# Patient Record
Sex: MALE | URBAN - NONMETROPOLITAN AREA
[De-identification: names, ages, dates, MRNs, and addresses within clinical notes are randomized per-mention and may not be internally consistent; named-entity substitution may affect disease eponyms.]

---

## 2018-04-03 ENCOUNTER — APPOINTMENT (RX ONLY)
Dept: URBAN - NONMETROPOLITAN AREA CLINIC 32 | Facility: CLINIC | Age: 83
Setting detail: DERMATOLOGY
End: 2018-04-03

## 2018-04-03 DIAGNOSIS — L81.4 OTHER MELANIN HYPERPIGMENTATION: ICD-10-CM

## 2018-04-03 DIAGNOSIS — L82.1 OTHER SEBORRHEIC KERATOSIS: ICD-10-CM

## 2018-04-03 DIAGNOSIS — L85.3 XEROSIS CUTIS: ICD-10-CM

## 2018-04-03 DIAGNOSIS — L57.8 OTHER SKIN CHANGES DUE TO CHRONIC EXPOSURE TO NONIONIZING RADIATION: ICD-10-CM

## 2018-04-03 DIAGNOSIS — L57.0 ACTINIC KERATOSIS: ICD-10-CM

## 2018-04-03 DIAGNOSIS — D22 MELANOCYTIC NEVI: ICD-10-CM

## 2018-04-03 DIAGNOSIS — D485 NEOPLASM OF UNCERTAIN BEHAVIOR OF SKIN: ICD-10-CM

## 2018-04-03 PROBLEM — E13.9 OTHER SPECIFIED DIABETES MELLITUS WITHOUT COMPLICATIONS: Status: ACTIVE | Noted: 2018-04-03

## 2018-04-03 PROBLEM — H91.90 UNSPECIFIED HEARING LOSS, UNSPECIFIED EAR: Status: ACTIVE | Noted: 2018-04-03

## 2018-04-03 PROBLEM — I10 ESSENTIAL (PRIMARY) HYPERTENSION: Status: ACTIVE | Noted: 2018-04-03

## 2018-04-03 PROBLEM — D48.5 NEOPLASM OF UNCERTAIN BEHAVIOR OF SKIN: Status: ACTIVE | Noted: 2018-04-03

## 2018-04-03 PROBLEM — E78.5 HYPERLIPIDEMIA, UNSPECIFIED: Status: ACTIVE | Noted: 2018-04-03

## 2018-04-03 PROBLEM — D22.9 MELANOCYTIC NEVI, UNSPECIFIED: Status: ACTIVE | Noted: 2018-04-03

## 2018-04-03 PROCEDURE — ? LIQUID NITROGEN

## 2018-04-03 PROCEDURE — 17004 DESTROY PREMAL LESIONS 15/>: CPT

## 2018-04-03 PROCEDURE — 99203 OFFICE O/P NEW LOW 30 MIN: CPT | Mod: 25

## 2018-04-03 PROCEDURE — 11100: CPT | Mod: 59

## 2018-04-03 PROCEDURE — ? COUNSELING

## 2018-04-03 PROCEDURE — ? BIOPSY BY SHAVE METHOD

## 2018-04-03 ASSESSMENT — LOCATION DETAILED DESCRIPTION DERM
LOCATION DETAILED: RIGHT CENTRAL MALAR CHEEK
LOCATION DETAILED: LEFT SUPERIOR LATERAL MALAR CHEEK

## 2018-04-03 ASSESSMENT — LOCATION ZONE DERM: LOCATION ZONE: FACE

## 2018-04-03 ASSESSMENT — LOCATION SIMPLE DESCRIPTION DERM
LOCATION SIMPLE: RIGHT CHEEK
LOCATION SIMPLE: LEFT CHEEK

## 2018-04-03 NOTE — PROCEDURE: BIOPSY BY SHAVE METHOD
Post-Care Instructions: I reviewed with the patient in detail post-care instructions. Patient is to keep the biopsy site dry overnight, and then apply bacitracin four times daily until healed, or if patient chooses to not wear bandage, apply bacitracin every hour.  Patient may apply very light hydrogen peroxide soaks to remove any crusting.

## 2018-04-03 NOTE — PROCEDURE: LIQUID NITROGEN
Duration Of Freeze Thaw-Cycle (Seconds): 30
Consent: The patient's consent was obtained including but not limited to risks of crusting, scabbing, blistering, scarring, darker or lighter pigmentary change, recurrence, incomplete removal and infection.
Number Of Freeze-Thaw Cycles: 1 freeze-thaw cycle
Post-Care Instructions: I reviewed with the patient in detail post-care instructions. Patient is to wear sunprotection, and avoid picking at any of the treated lesions. Pt may apply Vaseline to crusted or scabbing areas.
Render Post-Care Instructions In Note?: no
Total Number Of Aks Treated: 16
Aperture Size (Optional): C
Detail Level: Zone

## 2018-04-03 NOTE — PROCEDURE: BIOPSY BY SHAVE METHOD
Notification Instructions: Patient will be notified of biopsy results as soon as results are returned to our office and reviewed by provider.

## 2018-04-09 ENCOUNTER — RX ONLY (OUTPATIENT)
Age: 83
Setting detail: RX ONLY
End: 2018-04-09

## 2018-04-09 RX ORDER — CEPHALEXIN 500 MG/1
CAPSULE ORAL
Qty: 28 | Refills: 0 | Status: ERX | COMMUNITY
Start: 2018-04-09

## 2018-05-03 ENCOUNTER — RX ONLY (OUTPATIENT)
Age: 83
Setting detail: RX ONLY
End: 2018-05-03

## 2018-05-03 ENCOUNTER — APPOINTMENT (RX ONLY)
Dept: URBAN - NONMETROPOLITAN AREA CLINIC 31 | Facility: CLINIC | Age: 83
Setting detail: DERMATOLOGY
End: 2018-05-03

## 2018-05-03 DIAGNOSIS — D485 NEOPLASM OF UNCERTAIN BEHAVIOR OF SKIN: ICD-10-CM

## 2018-05-03 PROBLEM — C44.329 SQUAMOUS CELL CARCINOMA OF SKIN OF OTHER PARTS OF FACE: Status: ACTIVE | Noted: 2018-05-03

## 2018-05-03 PROBLEM — D48.5 NEOPLASM OF UNCERTAIN BEHAVIOR OF SKIN: Status: ACTIVE | Noted: 2018-05-03

## 2018-05-03 PROCEDURE — A4550 SURGICAL TRAYS: HCPCS

## 2018-05-03 PROCEDURE — ? BIOPSY BY SHAVE METHOD

## 2018-05-03 PROCEDURE — 13132 CMPLX RPR F/C/C/M/N/AX/G/H/F: CPT

## 2018-05-03 PROCEDURE — ? MOHS SURGERY

## 2018-05-03 PROCEDURE — 17311 MOHS 1 STAGE H/N/HF/G: CPT

## 2018-05-03 PROCEDURE — 11100: CPT | Mod: 59

## 2018-05-03 RX ORDER — CEPHALEXIN 500 MG/1
CAPSULE ORAL
Qty: 28 | Refills: 0 | Status: ERX

## 2018-05-03 ASSESSMENT — LOCATION ZONE DERM: LOCATION ZONE: SCALP

## 2018-05-03 ASSESSMENT — LOCATION DETAILED DESCRIPTION DERM: LOCATION DETAILED: LEFT SUPERIOR PARIETAL SCALP

## 2018-05-03 ASSESSMENT — LOCATION SIMPLE DESCRIPTION DERM: LOCATION SIMPLE: SCALP

## 2018-05-03 NOTE — PROCEDURE: MOHS SURGERY
Consent 2/Introductory Paragraph: I offered to answer any questions our patient had. Our patient indicated they were in receipt of our Mohs Brochure and had begun the previously prescribed prophylactic antibiotc. The rationale for Mohs was explained to the patient and consent was obtained. The risks, benefits and alternatives to therapy were discussed in detail. Specifically, the risks of infection, scarring, bleeding, prolonged wound healing, incomplete removal, allergy to anesthesia, nerve injury and recurrence were addressed. Prior to the procedure, the treatment site was clearly identified and confirmed by the patient. All components of Universal Protocol/PAUSE Rule completed.

## 2018-05-03 NOTE — PROCEDURE: MOHS SURGERY
Post-Care Instructions: I reviewed with the patient in detail post-care instructions. Patient is not to engage in any heavy lifting, exercise, or swimming for the next 14 days. Should the patient develop any fevers, chills, bleeding, severe pain patient will contact the office immediately. Our postoperative wound care handout was reviewed with and given to pt prior to discharge.

## 2018-05-09 ENCOUNTER — RX ONLY (OUTPATIENT)
Age: 83
Setting detail: RX ONLY
End: 2018-05-09

## 2018-05-09 RX ORDER — CEPHALEXIN 500 MG/1
CAPSULE ORAL
Qty: 28 | Refills: 0 | Status: ERX

## 2018-05-25 ENCOUNTER — APPOINTMENT (RX ONLY)
Dept: URBAN - NONMETROPOLITAN AREA CLINIC 31 | Facility: CLINIC | Age: 83
Setting detail: DERMATOLOGY
End: 2018-05-25

## 2018-05-25 PROBLEM — C44.42 SQUAMOUS CELL CARCINOMA OF SKIN OF SCALP AND NECK: Status: ACTIVE | Noted: 2018-05-25

## 2018-05-25 PROCEDURE — 17311 MOHS 1 STAGE H/N/HF/G: CPT

## 2018-05-25 PROCEDURE — A4550 SURGICAL TRAYS: HCPCS

## 2018-05-25 PROCEDURE — 13121 CMPLX RPR S/A/L 2.6-7.5 CM: CPT

## 2018-05-25 PROCEDURE — ? MOHS SURGERY

## 2018-06-01 ENCOUNTER — APPOINTMENT (RX ONLY)
Dept: URBAN - NONMETROPOLITAN AREA CLINIC 31 | Facility: CLINIC | Age: 83
Setting detail: DERMATOLOGY
End: 2018-06-01

## 2018-06-01 DIAGNOSIS — Z48.02 ENCOUNTER FOR REMOVAL OF SUTURES: ICD-10-CM

## 2018-06-01 PROCEDURE — ? SUTURE REMOVAL (GLOBAL PERIOD)

## 2018-06-01 ASSESSMENT — LOCATION DETAILED DESCRIPTION DERM: LOCATION DETAILED: LEFT CENTRAL PARIETAL SCALP

## 2018-06-01 ASSESSMENT — LOCATION SIMPLE DESCRIPTION DERM: LOCATION SIMPLE: SCALP

## 2018-06-01 ASSESSMENT — LOCATION ZONE DERM: LOCATION ZONE: SCALP

## 2018-06-01 NOTE — PROCEDURE: SUTURE REMOVAL (GLOBAL PERIOD)
Detail Level: Detailed
Add 39268 Cpt? (Important Note: In 2017 The Use Of 65647 Is Being Tracked By Cms To Determine Future Global Period Reimbursement For Global Periods): no

## 2019-11-18 NOTE — PROCEDURE: MOHS SURGERY
Detail Level: Simple Initiate Treatment: Apply a thin layer of Tretinion to your entire face at night Surgeon: Luiz Ross DO PharmD FAOCD [facility]-Mohs FASMS FAAD

## 2022-04-05 NOTE — PROCEDURE: MOHS SURGERY
Vascular and Interventional Radiology Manual Repair Warning Statement: We plan on removing the manually selected variable below in favor of our much easier automatic structured text blocks found in the previous tab. We decided to do this to help make the flow better and give you the full power of structured data. Manual selection is never going to be ideal in our platform and I would encourage you to avoid using manual selection from this point on, especially since I will be sunsetting this feature. It is important that you do one of two things with the customized text below. First, you can save all of the text in a word file so you can have it for future reference. Second, transfer the text to the appropriate area in the Library tab. Lastly, if there is a flap or graft type which we do not have you need to let us know right away so I can add it in before the variable is hidden. No need to panic, we plan to give you roughly 6 months to make the change.